# Patient Record
Sex: FEMALE | Race: WHITE | NOT HISPANIC OR LATINO
[De-identification: names, ages, dates, MRNs, and addresses within clinical notes are randomized per-mention and may not be internally consistent; named-entity substitution may affect disease eponyms.]

---

## 2018-07-24 ENCOUNTER — RESULT REVIEW (OUTPATIENT)
Age: 68
End: 2018-07-24

## 2018-07-24 ENCOUNTER — OUTPATIENT (OUTPATIENT)
Dept: OUTPATIENT SERVICES | Facility: HOSPITAL | Age: 68
LOS: 1 days | Discharge: HOME | End: 2018-07-24

## 2018-07-27 LAB — SURGICAL PATHOLOGY STUDY: SIGNIFICANT CHANGE UP

## 2021-09-03 PROBLEM — Z00.00 ENCOUNTER FOR PREVENTIVE HEALTH EXAMINATION: Status: ACTIVE | Noted: 2021-09-03

## 2021-09-08 ENCOUNTER — APPOINTMENT (OUTPATIENT)
Dept: OTOLARYNGOLOGY | Facility: CLINIC | Age: 71
End: 2021-09-08
Payer: MEDICARE

## 2021-09-08 VITALS — BODY MASS INDEX: 23.61 KG/M2 | TEMPERATURE: 97 F | HEIGHT: 64.5 IN | WEIGHT: 140 LBS

## 2021-09-08 DIAGNOSIS — Z78.9 OTHER SPECIFIED HEALTH STATUS: ICD-10-CM

## 2021-09-08 DIAGNOSIS — Z85.3 PERSONAL HISTORY OF MALIGNANT NEOPLASM OF BREAST: ICD-10-CM

## 2021-09-08 DIAGNOSIS — Z82.49 FAMILY HISTORY OF ISCHEMIC HEART DISEASE AND OTHER DISEASES OF THE CIRCULATORY SYSTEM: ICD-10-CM

## 2021-09-08 DIAGNOSIS — Z86.79 PERSONAL HISTORY OF OTHER DISEASES OF THE CIRCULATORY SYSTEM: ICD-10-CM

## 2021-09-08 DIAGNOSIS — Z80.6 FAMILY HISTORY OF LEUKEMIA: ICD-10-CM

## 2021-09-08 PROCEDURE — 92567 TYMPANOMETRY: CPT

## 2021-09-08 PROCEDURE — G0268 REMOVAL OF IMPACTED WAX MD: CPT

## 2021-09-08 PROCEDURE — 99203 OFFICE O/P NEW LOW 30 MIN: CPT | Mod: 25

## 2021-09-08 PROCEDURE — 99213 OFFICE O/P EST LOW 20 MIN: CPT

## 2021-09-08 PROCEDURE — 92557 COMPREHENSIVE HEARING TEST: CPT

## 2021-09-08 RX ORDER — LORAZEPAM 2 MG/1
TABLET ORAL
Refills: 0 | Status: ACTIVE | COMMUNITY

## 2021-09-08 RX ORDER — VALSARTAN AND HYDROCHLOROTHIAZIDE 320; 25 MG/1; MG/1
TABLET, FILM COATED ORAL
Refills: 0 | Status: ACTIVE | COMMUNITY

## 2021-09-08 RX ORDER — LEVOTHYROXINE SODIUM 137 UG/1
TABLET ORAL
Refills: 0 | Status: ACTIVE | COMMUNITY

## 2021-09-08 NOTE — HISTORY OF PRESENT ILLNESS
[de-identified] : Patient with history of previous cerumen impaction now feels strange sensation in her right ear when she places her head on the pillow. Questions whether it could be wax.  Denies otalgia, otorrhea, tinnitus, or vertigo.  Patient has no previous otologic history or acoustic trauma.\par \par

## 2021-09-08 NOTE — ASSESSMENT
[FreeTextEntry1] : Right abnormal auditory perception. I reassured her her hearing is basically normal except for some mild presbycusis. She will see whether removal of cerumen results her symptoms. If not she will followup as needed

## 2021-12-16 ENCOUNTER — APPOINTMENT (OUTPATIENT)
Dept: OTOLARYNGOLOGY | Facility: CLINIC | Age: 71
End: 2021-12-16
Payer: MEDICARE

## 2021-12-16 VITALS — HEIGHT: 64 IN | TEMPERATURE: 97.6 F | BODY MASS INDEX: 23.9 KG/M2 | WEIGHT: 140 LBS

## 2021-12-16 DIAGNOSIS — H93.291 OTHER ABNORMAL AUDITORY PERCEPTIONS, RIGHT EAR: ICD-10-CM

## 2021-12-16 PROCEDURE — 99213 OFFICE O/P EST LOW 20 MIN: CPT

## 2021-12-16 NOTE — HISTORY OF PRESENT ILLNESS
[de-identified] : Patient with history of previous cerumen impaction now felt strange sensation in her right ear when she places her head on the pillow at last visit\par \par Today feels a clicking sensation and a clogged left ear.  Questions whether it could be wax.  Denies otalgia, otorrhea, tinnitus, or vertigo.  Patient has no previous otologic history or acoustic trauma.\par \par

## 2022-04-18 ENCOUNTER — APPOINTMENT (OUTPATIENT)
Dept: OTOLARYNGOLOGY | Facility: CLINIC | Age: 72
End: 2022-04-18
Payer: MEDICARE

## 2022-04-18 VITALS — HEIGHT: 64.5 IN | BODY MASS INDEX: 23.61 KG/M2 | WEIGHT: 140 LBS | TEMPERATURE: 96.2 F

## 2022-04-18 DIAGNOSIS — H60.8X3 OTHER OTITIS EXTERNA, BILATERAL: ICD-10-CM

## 2022-04-18 DIAGNOSIS — H93.90 UNSPECIFIED DISORDER OF EAR, UNSPECIFIED EAR: ICD-10-CM

## 2022-04-18 PROCEDURE — 99214 OFFICE O/P EST MOD 30 MIN: CPT

## 2022-04-18 RX ORDER — ATORVASTATIN CALCIUM 10 MG/1
10 TABLET, FILM COATED ORAL
Qty: 90 | Refills: 0 | Status: ACTIVE | COMMUNITY
Start: 2021-10-07

## 2022-04-18 RX ORDER — CITALOPRAM HYDROBROMIDE 20 MG/1
20 TABLET, FILM COATED ORAL
Qty: 90 | Refills: 0 | Status: ACTIVE | COMMUNITY
Start: 2021-11-21

## 2022-04-18 RX ORDER — MOMETASONE FUROATE 1 MG/ML
0.1 SOLUTION TOPICAL
Qty: 1 | Refills: 2 | Status: ACTIVE | COMMUNITY
Start: 2022-04-18 | End: 1900-01-01

## 2022-04-18 RX ORDER — AMLODIPINE BESYLATE 10 MG/1
10 TABLET ORAL
Qty: 90 | Refills: 0 | Status: ACTIVE | COMMUNITY
Start: 2021-10-26

## 2022-04-18 RX ORDER — TRAMADOL HYDROCHLORIDE 50 MG/1
50 TABLET, COATED ORAL
Qty: 60 | Refills: 0 | Status: ACTIVE | COMMUNITY
Start: 2022-04-10

## 2022-04-18 RX ORDER — LEVOTHYROXINE SODIUM 0.07 MG/1
75 TABLET ORAL
Qty: 90 | Refills: 0 | Status: ACTIVE | COMMUNITY
Start: 2022-01-28

## 2022-04-18 RX ORDER — LORAZEPAM 1 MG/1
1 TABLET ORAL
Qty: 90 | Refills: 0 | Status: ACTIVE | COMMUNITY
Start: 2022-02-07

## 2022-04-18 RX ORDER — ONDANSETRON 4 MG/1
4 TABLET ORAL
Qty: 30 | Refills: 0 | Status: ACTIVE | COMMUNITY
Start: 2022-03-04

## 2022-04-18 RX ORDER — ZOLPIDEM TARTRATE 10 MG/1
10 TABLET ORAL
Qty: 30 | Refills: 0 | Status: ACTIVE | COMMUNITY
Start: 2022-01-18

## 2022-04-18 RX ORDER — SUMATRIPTAN 100 MG/1
100 TABLET, FILM COATED ORAL
Qty: 9 | Refills: 0 | Status: ACTIVE | COMMUNITY
Start: 2022-04-10

## 2022-04-18 NOTE — HISTORY OF PRESENT ILLNESS
[de-identified] : Patient with history of previous cerumen impaction  Seen Dec 21 with a clicking sensation and a clogged left ear.  Questions whether it could be wax.  Denies otalgia, otorrhea, tinnitus, or vertigo.  Patient has no previous otologic history or acoustic trauma.\par \par Now reports scratching her ear on the left side several weeks ago.  She had a small scab.  Was unable to find previous prescription of Elocon which was helpful.  She has been treating it with Aquaphor\par \par

## 2022-04-18 NOTE — ASSESSMENT
[FreeTextEntry1] : Left posterior tragal scabbing.  She has chronic eczematous otitis externa.  Recommended no manipulation and continued use of Aquaphor.  I will also prescribe Elocon for her itchy ears.  If she fails to improve she will follow-up \par \par

## 2023-07-07 ENCOUNTER — APPOINTMENT (OUTPATIENT)
Dept: OTOLARYNGOLOGY | Facility: CLINIC | Age: 73
End: 2023-07-07
Payer: MEDICARE

## 2023-07-07 DIAGNOSIS — H90.3 SENSORINEURAL HEARING LOSS, BILATERAL: ICD-10-CM

## 2023-07-07 DIAGNOSIS — H61.21 IMPACTED CERUMEN, RIGHT EAR: ICD-10-CM

## 2023-07-07 DIAGNOSIS — H92.01 OTALGIA, RIGHT EAR: ICD-10-CM

## 2023-07-07 PROCEDURE — 69210 REMOVE IMPACTED EAR WAX UNI: CPT

## 2023-07-07 PROCEDURE — 99213 OFFICE O/P EST LOW 20 MIN: CPT | Mod: 25

## 2023-07-07 NOTE — ASSESSMENT
[FreeTextEntry1] : She has a 2-month history of intermittent right ear discomfort when she lays her head on the pillow.  She had cerumen impaction.  Her ears were otherwise normal on exam.  I discussed possible etiologies.\par \par Plan\par -Findings and management options were discussed with the patient.\par -Good ear hygiene reviewed\par -Avoid using cotton swabs in the ears\par -She declined an audiogram to check her hearing.\par -I spoke with her about further work-up for referred ear pain.  She deferred flexible laryngoscopy today.  If the discomfort persist, I recommend she return for the procedure and discuss further work-up.  She is also going to check to see if her recent PET scan included the head and neck area\par -I recommended follow-up in 2 weeks if the pain has not completely resolved\par -She should call and return earlier if any concerns or worsening symptom\par

## 2023-07-07 NOTE — HISTORY OF PRESENT ILLNESS
[de-identified] : SELINA RUSSELL is a 73 year old patient with intermittent right ear pain when she lays down and puts her head on the pillow.  It has been recurrent intermittently over the past 2 months.  She has had this in the past.  She has no otorrhea, tinnitus, or dizziness.  She has no throat pain or dysphagia.  She denies a known history of TMJ dysfunction.  She does not smoke.  She did have a recent PET scan for breast cancer.  She thinks the head and neck area may have been included.  She has seen Dr. Avelar in the past for recurrent cerumen impaction

## 2024-08-08 ENCOUNTER — APPOINTMENT (OUTPATIENT)
Dept: OTOLARYNGOLOGY | Facility: CLINIC | Age: 74
End: 2024-08-08

## 2024-08-08 PROCEDURE — 99214 OFFICE O/P EST MOD 30 MIN: CPT

## 2024-08-08 NOTE — ASSESSMENT
[FreeTextEntry1] : She has a history of chronic eczematous otitis externa.  She had irritation and bleeding the other week from scratching it.  It has since resolved.  There was a little dry wax bilaterally which was removed.  There was no infection.  Plan -Findings and management options were discussed with the patient. - Good ear hygiene reviewed.  She should avoid scratching the ears with her fingers or objects - DermOtic solution as needed for itching - She declined an audiogram to check her hearing - Follow-up in 1 year if doing well - She should call and return earlier if any problems

## 2024-08-08 NOTE — PHYSICAL EXAM
[TextEntry] : General: The patient was alert, oriented and in no distress. Voice was clear.  Face: The patient had no facial asymmetry or mass. The skin was unremarkable.  Ears: Hearing normal to conversational voice External ears were normal without deformity.   PROCEDURE- EAR MICROSCOPY    Indication: bleeding from the ear, itchy ears Under the microscope, scant cerumen was removed atraumatically from the ears with curette and forceps. Canal: mildly dry. no inflammation. Tympanic membrane: Intact. no perforation or effusion.  Nose: The external nose had no significant deformity. There was no facial tenderness. On anterior rhinoscopy, the nasal mucosa was normal. The anterior septum was grossly midline. There were no visualized polyps, purulence or masses.  Oral cavity: Oral mucosa- normal. Oral and base of tongue- clear and without mass. Gingival and buccal mucosa- moist and without lesions. Palate- the palate moved well. There was no cleft palate. There appeared to be good salivary flow. Oral cavity/oropharynx- no pus, erythema or mass  Neck: The neck was symmetrical. The parotid and submandibular glands were normal without masses. The trachea was midline and there was no unusual crepitus. Thyroid was smooth and nontender and no masses were palpated. No masses  Lymphatics: Cervical adenopathy- none.

## 2024-08-08 NOTE — HISTORY OF PRESENT ILLNESS
[de-identified] : SELINA RUSSELL is a 74 year old patient With chronic eczematous otitis externa here for bloody drainage from the left ear.  She did scratch it with a Q-tip.  She has no otalgia.  She has not had bleeding for the past week.  She is on 2 new medications for metastatic breast cancer